# Patient Record
Sex: FEMALE | Race: OTHER | HISPANIC OR LATINO | URBAN - METROPOLITAN AREA
[De-identification: names, ages, dates, MRNs, and addresses within clinical notes are randomized per-mention and may not be internally consistent; named-entity substitution may affect disease eponyms.]

---

## 2019-06-30 ENCOUNTER — OUTPATIENT (OUTPATIENT)
Dept: OUTPATIENT SERVICES | Facility: HOSPITAL | Age: 54
LOS: 1 days | Discharge: HOME | End: 2019-06-30
Payer: MEDICAID

## 2019-06-30 DIAGNOSIS — C73 MALIGNANT NEOPLASM OF THYROID GLAND: ICD-10-CM

## 2019-06-30 PROCEDURE — 70491 CT SOFT TISSUE NECK W/DYE: CPT | Mod: 26

## 2019-08-14 ENCOUNTER — OUTPATIENT (OUTPATIENT)
Dept: OUTPATIENT SERVICES | Facility: HOSPITAL | Age: 54
LOS: 1 days | Discharge: HOME | End: 2019-08-14
Payer: MEDICAID

## 2019-08-14 DIAGNOSIS — E06.9 THYROIDITIS, UNSPECIFIED: ICD-10-CM

## 2019-08-15 PROCEDURE — 78020 THYROID MET UPTAKE: CPT | Mod: 26

## 2019-08-15 PROCEDURE — 78018 THYROID MET IMAGING BODY: CPT | Mod: 26

## 2021-11-16 ENCOUNTER — EMERGENCY (EMERGENCY)
Facility: HOSPITAL | Age: 56
LOS: 0 days | Discharge: HOME | End: 2021-11-16
Attending: STUDENT IN AN ORGANIZED HEALTH CARE EDUCATION/TRAINING PROGRAM | Admitting: EMERGENCY MEDICINE
Payer: MEDICAID

## 2021-11-16 VITALS
TEMPERATURE: 98 F | WEIGHT: 199.96 LBS | DIASTOLIC BLOOD PRESSURE: 77 MMHG | HEART RATE: 88 BPM | RESPIRATION RATE: 18 BRPM | SYSTOLIC BLOOD PRESSURE: 195 MMHG | OXYGEN SATURATION: 99 %

## 2021-11-16 DIAGNOSIS — E03.9 HYPOTHYROIDISM, UNSPECIFIED: ICD-10-CM

## 2021-11-16 DIAGNOSIS — I10 ESSENTIAL (PRIMARY) HYPERTENSION: ICD-10-CM

## 2021-11-16 DIAGNOSIS — E11.9 TYPE 2 DIABETES MELLITUS WITHOUT COMPLICATIONS: ICD-10-CM

## 2021-11-16 DIAGNOSIS — R07.89 OTHER CHEST PAIN: ICD-10-CM

## 2021-11-16 DIAGNOSIS — M79.602 PAIN IN LEFT ARM: ICD-10-CM

## 2021-11-16 DIAGNOSIS — J45.909 UNSPECIFIED ASTHMA, UNCOMPLICATED: ICD-10-CM

## 2021-11-16 DIAGNOSIS — Z87.891 PERSONAL HISTORY OF NICOTINE DEPENDENCE: ICD-10-CM

## 2021-11-16 DIAGNOSIS — R53.83 OTHER FATIGUE: ICD-10-CM

## 2021-11-16 DIAGNOSIS — Z20.822 CONTACT WITH AND (SUSPECTED) EXPOSURE TO COVID-19: ICD-10-CM

## 2021-11-16 DIAGNOSIS — E78.5 HYPERLIPIDEMIA, UNSPECIFIED: ICD-10-CM

## 2021-11-16 LAB
ALBUMIN SERPL ELPH-MCNC: 4.6 G/DL — SIGNIFICANT CHANGE UP (ref 3.5–5.2)
ALP SERPL-CCNC: 104 U/L — SIGNIFICANT CHANGE UP (ref 30–115)
ALT FLD-CCNC: 37 U/L — SIGNIFICANT CHANGE UP (ref 0–41)
ANION GAP SERPL CALC-SCNC: 18 MMOL/L — HIGH (ref 7–14)
AST SERPL-CCNC: 29 U/L — SIGNIFICANT CHANGE UP (ref 0–41)
BASOPHILS # BLD AUTO: 0.1 K/UL — SIGNIFICANT CHANGE UP (ref 0–0.2)
BASOPHILS NFR BLD AUTO: 0.9 % — SIGNIFICANT CHANGE UP (ref 0–1)
BILIRUB SERPL-MCNC: 0.3 MG/DL — SIGNIFICANT CHANGE UP (ref 0.2–1.2)
BUN SERPL-MCNC: 13 MG/DL — SIGNIFICANT CHANGE UP (ref 10–20)
CALCIUM SERPL-MCNC: 9.4 MG/DL — SIGNIFICANT CHANGE UP (ref 8.5–10.1)
CHLORIDE SERPL-SCNC: 101 MMOL/L — SIGNIFICANT CHANGE UP (ref 98–110)
CO2 SERPL-SCNC: 20 MMOL/L — SIGNIFICANT CHANGE UP (ref 17–32)
CREAT SERPL-MCNC: 0.6 MG/DL — LOW (ref 0.7–1.5)
EOSINOPHIL # BLD AUTO: 0.75 K/UL — HIGH (ref 0–0.7)
EOSINOPHIL NFR BLD AUTO: 7 % — SIGNIFICANT CHANGE UP (ref 0–8)
GLUCOSE SERPL-MCNC: 74 MG/DL — SIGNIFICANT CHANGE UP (ref 70–99)
HCG SERPL QL: NEGATIVE — SIGNIFICANT CHANGE UP
HCT VFR BLD CALC: 40.9 % — SIGNIFICANT CHANGE UP (ref 37–47)
HGB BLD-MCNC: 13.1 G/DL — SIGNIFICANT CHANGE UP (ref 12–16)
IMM GRANULOCYTES NFR BLD AUTO: 0.3 % — SIGNIFICANT CHANGE UP (ref 0.1–0.3)
LYMPHOCYTES # BLD AUTO: 3.92 K/UL — HIGH (ref 1.2–3.4)
LYMPHOCYTES # BLD AUTO: 36.8 % — SIGNIFICANT CHANGE UP (ref 20.5–51.1)
MAGNESIUM SERPL-MCNC: 2.1 MG/DL — SIGNIFICANT CHANGE UP (ref 1.8–2.4)
MCHC RBC-ENTMCNC: 25.8 PG — LOW (ref 27–31)
MCHC RBC-ENTMCNC: 32 G/DL — SIGNIFICANT CHANGE UP (ref 32–37)
MCV RBC AUTO: 80.7 FL — LOW (ref 81–99)
MONOCYTES # BLD AUTO: 0.94 K/UL — HIGH (ref 0.1–0.6)
MONOCYTES NFR BLD AUTO: 8.8 % — SIGNIFICANT CHANGE UP (ref 1.7–9.3)
NEUTROPHILS # BLD AUTO: 4.9 K/UL — SIGNIFICANT CHANGE UP (ref 1.4–6.5)
NEUTROPHILS NFR BLD AUTO: 46.2 % — SIGNIFICANT CHANGE UP (ref 42.2–75.2)
NRBC # BLD: 0 /100 WBCS — SIGNIFICANT CHANGE UP (ref 0–0)
NT-PROBNP SERPL-SCNC: 32 PG/ML — SIGNIFICANT CHANGE UP (ref 0–300)
PLATELET # BLD AUTO: 437 K/UL — HIGH (ref 130–400)
POTASSIUM SERPL-MCNC: 4.6 MMOL/L — SIGNIFICANT CHANGE UP (ref 3.5–5)
POTASSIUM SERPL-SCNC: 4.6 MMOL/L — SIGNIFICANT CHANGE UP (ref 3.5–5)
PROT SERPL-MCNC: 7.7 G/DL — SIGNIFICANT CHANGE UP (ref 6–8)
RBC # BLD: 5.07 M/UL — SIGNIFICANT CHANGE UP (ref 4.2–5.4)
RBC # FLD: 14.4 % — SIGNIFICANT CHANGE UP (ref 11.5–14.5)
SARS-COV-2 RNA SPEC QL NAA+PROBE: SIGNIFICANT CHANGE UP
SODIUM SERPL-SCNC: 139 MMOL/L — SIGNIFICANT CHANGE UP (ref 135–146)
TROPONIN T SERPL-MCNC: <0.01 NG/ML — SIGNIFICANT CHANGE UP
TROPONIN T SERPL-MCNC: <0.01 NG/ML — SIGNIFICANT CHANGE UP
WBC # BLD: 10.64 K/UL — SIGNIFICANT CHANGE UP (ref 4.8–10.8)
WBC # FLD AUTO: 10.64 K/UL — SIGNIFICANT CHANGE UP (ref 4.8–10.8)

## 2021-11-16 PROCEDURE — 93010 ELECTROCARDIOGRAM REPORT: CPT

## 2021-11-16 PROCEDURE — 71046 X-RAY EXAM CHEST 2 VIEWS: CPT | Mod: 26,77

## 2021-11-16 PROCEDURE — 99218: CPT

## 2021-11-16 PROCEDURE — 71046 X-RAY EXAM CHEST 2 VIEWS: CPT | Mod: 26

## 2021-11-16 RX ORDER — KETOROLAC TROMETHAMINE 30 MG/ML
30 SYRINGE (ML) INJECTION ONCE
Refills: 0 | Status: DISCONTINUED | OUTPATIENT
Start: 2021-11-16 | End: 2021-11-16

## 2021-11-16 RX ORDER — METFORMIN HYDROCHLORIDE 850 MG/1
850 TABLET ORAL DAILY
Refills: 0 | Status: DISCONTINUED | OUTPATIENT
Start: 2021-11-16 | End: 2021-11-16

## 2021-11-16 RX ORDER — ATORVASTATIN CALCIUM 80 MG/1
40 TABLET, FILM COATED ORAL AT BEDTIME
Refills: 0 | Status: DISCONTINUED | OUTPATIENT
Start: 2021-11-16 | End: 2021-11-16

## 2021-11-16 RX ORDER — LEVOTHYROXINE SODIUM 125 MCG
137 TABLET ORAL DAILY
Refills: 0 | Status: DISCONTINUED | OUTPATIENT
Start: 2021-11-16 | End: 2021-11-16

## 2021-11-16 RX ORDER — ASPIRIN/CALCIUM CARB/MAGNESIUM 324 MG
81 TABLET ORAL DAILY
Refills: 0 | Status: DISCONTINUED | OUTPATIENT
Start: 2021-11-17 | End: 2021-11-16

## 2021-11-16 RX ORDER — ASPIRIN/CALCIUM CARB/MAGNESIUM 324 MG
325 TABLET ORAL ONCE
Refills: 0 | Status: COMPLETED | OUTPATIENT
Start: 2021-11-16 | End: 2021-11-16

## 2021-11-16 RX ORDER — PANTOPRAZOLE SODIUM 20 MG/1
40 TABLET, DELAYED RELEASE ORAL ONCE
Refills: 0 | Status: COMPLETED | OUTPATIENT
Start: 2021-11-16 | End: 2021-11-16

## 2021-11-16 RX ORDER — ASPIRIN/CALCIUM CARB/MAGNESIUM 324 MG
162 TABLET ORAL ONCE
Refills: 0 | Status: DISCONTINUED | OUTPATIENT
Start: 2021-11-16 | End: 2021-11-16

## 2021-11-16 RX ADMIN — Medication 30 MILLIGRAM(S): at 15:45

## 2021-11-16 RX ADMIN — Medication 325 MILLIGRAM(S): at 18:14

## 2021-11-16 RX ADMIN — Medication 30 MILLIGRAM(S): at 15:16

## 2021-11-16 RX ADMIN — PANTOPRAZOLE SODIUM 40 MILLIGRAM(S): 20 TABLET, DELAYED RELEASE ORAL at 23:51

## 2021-11-16 RX ADMIN — ATORVASTATIN CALCIUM 40 MILLIGRAM(S): 80 TABLET, FILM COATED ORAL at 22:49

## 2021-11-16 NOTE — ED PROVIDER NOTE - NS ED ATTENDING STATEMENT MOD
Adali-daughter called.     Patient was supposed to have received a call from the office about arranging an injection appointment. Patient had a virtual visit with Dr. Burks last month.   Please advise on when to schedule.    Please call Adali @ [phone]   Attending with

## 2021-11-16 NOTE — ED PROVIDER NOTE - PROGRESS NOTE DETAILS
spoke with pt via [place]  #[id number] explained all results, pt agreeable to overnight stay in OBS for CCTA

## 2021-11-16 NOTE — ED PROVIDER NOTE - CLINICAL SUMMARY MEDICAL DECISION MAKING FREE TEXT BOX
Labs ok, EKG nonischemic. CXR no infiltrate. Concerned for possible cardiac etiology for sx, will place in CDU for further workup. Pt amenable to plan.

## 2021-11-16 NOTE — ED PROVIDER NOTE - OBJECTIVE STATEMENT
Pt is a 57y/o female with a pmhx of Asthma, HTN, HLD, DM, Hypothyroidism here for eval of constant pressure-like left sided chest discomfort with radiation down left arm that began last night while at rest with associated fatigue specifically on exertion. Pt denies fever, chills, cough, n/v/d, abd pain, dysuria, hematuria

## 2021-11-16 NOTE — ED CDU PROVIDER INITIAL DAY NOTE - NS ED ROS FT
Constitutional: (-) fever  Eyes/ENT: (-) blurry vision, (-) epistaxis  Cardiovascular: (+) chest pain, (-) syncope  Respiratory: (-) cough, (-) shortness of breath  Gastrointestinal: (-) vomiting, (-) diarrhea  Musculoskeletal: (+) neck pain, (+) LUE pain, (-) back pain, (-) joint pain  Integumentary: (-) rash, (-) edema  Neurological: (-) headache, (-) altered mental status  Psychiatric: (-) hallucinations  Allergic/Immunologic: (-) pruritus

## 2021-11-16 NOTE — ED CDU PROVIDER INITIAL DAY NOTE - MEDICAL DECISION MAKING DETAILS
56F with pmh HTN, HLD, hypothyroidism, asthma well controlled who p/w L sided CP radiating to LUE. EKG non-ischemic, trop neg x2, CXR clear. Patient received ASA 324mg, toradol, and protonix. Patient placed in obs for CCTA.

## 2021-11-16 NOTE — ED CDU PROVIDER INITIAL DAY NOTE - OBJECTIVE STATEMENT
The pt is a 56y F w/ hx of HTN, DLD, DM, asthma, hypothyroidism presenting with retrosternal/L sided chest pain that radiates to the L neck & L arm. Pt states she has felt this chest pain in the past, but never to this severity or with this radiation. This episode started last night, at its worst is 10/10. Today after Toradol in the ED and Aspirin, pt states pain is improved. No associated SOB. Denies fever, headache, N/V, abdominal pain, diarrhea, dysuria.

## 2021-11-16 NOTE — ED PROVIDER NOTE - NS ED ROS FT
Eyes:  No visual changes, eye pain or discharge.  ENMT:  No hearing changes, pain, discharge or infections. No neck pain or stiffness.  Cardiac:  + CP No SOB or edema. No chest pain with exertion.  Respiratory:  No cough or respiratory distress. No hemoptysis. No history of asthma or RAD.  GI:  No nausea, vomiting, diarrhea or abdominal pain.  :  No dysuria, frequency or burning.  MS:  No myalgia, muscle weakness, joint pain or back pain.  Neuro:  No headache or weakness.  No LOC.  Skin:  No skin rash.   Endocrine: + dm No history of thyroid disease   Except as documented in the HPI,  all other systems are negative.

## 2021-11-16 NOTE — ED PROVIDER NOTE - ATTENDING CONTRIBUTION TO CARE
55yo woman, former smoker with h/o asthma, HTN, DM, HLD, hypothyroid c/o pressurelike chest pain, L sided, radiating down L arm that began last night. Additionally she complains of feeling very tired when trying to get things done--walking up stairs, doing normal household tasks. No fever, chills, cough. VS, exam as noted, pt nontoxic appearing, lungs CTA, CVS1S2 RRR abd soft, no peripheral edema. Will check labs, EKG, CXR, consider CDU for further ACS workup.

## 2021-11-17 VITALS — HEART RATE: 60 BPM

## 2021-11-17 PROCEDURE — G1004: CPT

## 2021-11-17 PROCEDURE — 75574 CT ANGIO HRT W/3D IMAGE: CPT | Mod: 26,MF

## 2021-11-17 PROCEDURE — 99217: CPT

## 2021-11-17 RX ORDER — METOPROLOL TARTRATE 50 MG
50 TABLET ORAL ONCE
Refills: 0 | Status: COMPLETED | OUTPATIENT
Start: 2021-11-17 | End: 2021-11-17

## 2021-11-17 RX ADMIN — Medication 5 MILLIGRAM(S): at 05:27

## 2021-11-17 RX ADMIN — Medication 50 MILLIGRAM(S): at 08:12

## 2021-11-17 RX ADMIN — Medication 81 MILLIGRAM(S): at 13:11

## 2021-11-17 RX ADMIN — Medication 137 MICROGRAM(S): at 05:28

## 2021-11-17 RX ADMIN — Medication 50 MILLIGRAM(S): at 07:06

## 2021-11-17 NOTE — ED CDU PROVIDER DISPOSITION NOTE - CARE PROVIDER_API CALL
your PMD,   Phone: (   )    -  Fax: (   )    -  Follow Up Time: 1-3 Days    Clint Rolon (MD)  Cardiovascular Disease; Internal Medicine; Interventional Cardiology  [address] [place] [address]  Phone: [phone]  Fax: [phone]  Follow Up Time: 1-3 Days

## 2021-11-17 NOTE — ED CDU PROVIDER SUBSEQUENT DAY NOTE - PROGRESS NOTE DETAILS
Pt resting comfortably, denies any complaints. Will continue to monitor. patient comfortable, awaiting CCTA Dr Reyes discussed results with patient. Will dc home with outpatient f/u

## 2021-11-17 NOTE — ED CDU PROVIDER DISPOSITION NOTE - PATIENT PORTAL LINK FT
You can access the FollowMyHealth Patient Portal offered by [facility] by registering at the following website: [email or web address]. By joining [employer]’s FollowMyHealth portal, you will also be able to view your health information using other applications (apps) compatible with our system.

## 2021-11-17 NOTE — ED CDU PROVIDER DISPOSITION NOTE - CLINICAL COURSE
56F with pmh HTN, HLD, hypothyroidism, asthma well controlled who p/w L sided CP radiating to LUE. EKG non-ischemic, trop neg x2, CXR clear. Patient received ASA 324mg, toradol, and protonix. Patient placed in obs for CCTA which showed a calcium score of 1 and CAD-RADS of 0. Results were discussed with the patient, her , and her daughter. I have fully discussed the medical management and delivery of care with the patient. I have discussed any available labs, imaging and treatment options with the patient. All Questions answered at the bedside and printed copies of all results provided and recommended to review with PCP. Patient confirms understanding and has been given detailed return precautions. Patient instructed to return to the ED should symptoms persist or worsen. Patient has demonstrated capacity and has verbalized understanding. Patient is well appearing upon discharge, ambulatory with a steady gait.

## 2021-11-17 NOTE — ED CDU PROVIDER SUBSEQUENT DAY NOTE - MEDICAL DECISION MAKING DETAILS
56F with pmh HTN, HLD, hypothyroidism, asthma well controlled who p/w L sided CP radiating to LUE. EKG non-ischemic, trop neg x2, CXR clear. Patient received ASA 324mg, toradol, and protonix. Patient placed in obs for CCTA. Comfortable appearing this morning.

## 2021-11-17 NOTE — ED ADULT NURSE REASSESSMENT NOTE - NS ED NURSE REASSESS COMMENT FT1
Patient report received from previous RN, patient at this time is resting in bed with no acute distress, alert and oriented x4, admitted to OBS, will go for CCTA this AM pending controlled HR, will continue to watch and assess patient, safety and comfort measures maintained.

## 2021-11-17 NOTE — ED CDU PROVIDER DISPOSITION NOTE - NSFOLLOWUPINSTRUCTIONS_ED_ALL_ED_FT
Chest Pain    Chest pain can be caused by many different conditions which may or may not be dangerous. Causes include heartburn, lung infections, heart attack, blood clot in lungs, skin infections, strain or damage to muscle, cartilage, or bones, etc. In addition to a history and physical examination, an electrocardiogram (ECG) or other lab tests may have been performed to determine the cause of your chest pain. Follow up with your primary care provider or with a cardiologist as instructed.     SEEK IMMEDIATE MEDICAL CARE IF YOU HAVE ANY OF THE FOLLOWING SYMPTOMS: worsening chest pain, coughing up blood, unexplained back/neck/jaw pain, severe abdominal pain, dizziness or lightheadedness, fainting, shortness of breath, sweaty or clammy skin, vomiting, or racing heart beat. These symptoms may represent a serious problem that is an emergency. Do not wait to see if the symptoms will go away. Get medical help right away. Call 911 and do not drive yourself to the hospital.        Dolor de pecho    LO QUE NECESITA SABER:    El dolor en el pecho puede ser provocado por ama variedad de condiciones, algunas no tan serias y otras que son de peligro mortal. El dolor de pecho también puede ser un síntoma de un problema digestivo, marzena la acidez o ama úlcera estomacal. Un ataque de ansiedad o ama emoción desi, marzena el enojo, también pueden provocar dolor de pecho. Ama infección, inflamación o fractura en un hueso o cartílago en el pecho podría provocar dolor o molestia. En ocasiones el dolor torácico o la presión en el pecho pueden ser el resultado de ricky circulación de la sandy al corazón (angina). El dolor de pecho también puede ser causado por trastornos potencialmente mortales marzena un ataque al corazón o un coágulo de sandy en los pulmones.    INSTRUCCIONES SOBRE EL [facility]STOP[facility] HOSPITALARIA:    Llame al número local de emergencias (911 en los [place]) o pídale a alguien que llame si:  •Tiene alguno de los siguientes signos de un ataque cardíaco: ?Estrujamiento, presión o tensión en [facility]blood pecho      ?Usted también podría presentar alguno de los siguientes: ?Malestar o dolor en youngblood espalda, mami, mandíbula, abdomen, o brazo      ?Falta de aliento      ?Náuseas o vómitos      ?Desvanecimiento o sudor frío repentino            Regrese a la radhika de emergencias si:  •La inflamación en youngblood pecho empeora, aun con tratamiento.      •Usted tose o vomita sandy.      •Eugenia heces son negras o tienen sandy.      •Usted no puede dejar de vomitar o le duele al tragar.      Llame a youngblood médico si:  •Usted tiene preguntas o inquietudes acerca de youngblood condición o cuidado.          Medicamentos:  •Los medicamentospueden administrarse para tratar la causa del dolor de pecho. Por ejemplo, analgésicos, medicamentos para la ansiedad o medicamentos para aumentar el flujo de sandy al corazón.      •No tome ciertos medicamentos sin antes preguntarle a youngblood médico.Estos incluyen ADRIAN, suplementos vitamínicos o a base de hierbas u hormonas (estrógeno o progestágeno).      •[place] eugenia medicamentos marzena se le haya indicado.Consulte con youngblood médico si usted braden que youngblood medicamento no le está ayudando o si presenta efectos secundarios. Infórmele si es alérgico a cualquier medicamento. Mantenga ama lista actualizada de los medicamentos, las vitaminas y los productos herbales que francis. Incluya los siguientes datos de los medicamentos: cantidad, frecuencia y motivo de administración. Traiga con usted la lista o los envases de las píldoras a eugenia citas de seguimiento. Lleve la lista de los medicamentos con usted en dora de ama emergencia.      Consejos para vivir saludable:Los siguientes son consejos generales de keeley. Si se conoce la causa de youngblood dolor de pecho, youngblood médico le dará pautas específicas a seguir.  •No fume.La nicotina y otros químicos contenidos en los cigarrillos y cigarros pueden causar daño a eugenia pulmones y el corazón. Pida información a youngblood médico si usted actualmente fuma y necesita ayuda para dejar de fumar. Los cigarrillos electrónicos o el tabaco sin humo igualmente contienen nicotina. Consulte con youngblood médico antes de utilizar estos productos.      •Elija ama variedad de alimentos saludables tan a menudo marzena sea posible.Incluya frutas y verduras frescas, congeladas o enlatadas. También incluya productos lácteos bajos en grasa, pescado, danyelle (sin piel) y anne magras. Youngblood médico o dietista pueden ayudarlo a crear planes de alimentos. Es posible que tenga que evitar ciertos alimentos o bebidas si el dolor es causado por un problema de digestión.  Alimentos saludables           •Reduzca el consumo de sodio (sal).Limite el consumo de alimentos altos en sodio, marzena comidas enlatadas, bocadillos salados y embutidos. Si añade felice cuando cocina la comida, no añada más en la jones. Elija alimentos enlatados bajos en sodio tanto marzena sea posible.             •Consuma abundante agua al día.El agua ayuda al cuerpo a controlar la temperatura y la presión arterial. Pregunte a youngblood médico cuál es la cantidad de agua que debería consumir cada día.      •Pregunte acerca de la actividad.Youngblood médico le dirá cuáles actividades limitar y cuáles evitar. Pregunte cuándo puede manejar, regresar a youngblood trabajo y tener relaciones sexuales. Pida más información acerca de un plan de ejercicio adecuado para usted.      •Mantenga un peso saludable.Pregúntele a youngblood médico cuál es el peso ideal para usted. Pídale que lo ayude a crear un plan seguro para bajar de peso si tiene sobrepeso.      •Pregunte sobre las vacunas que pudiera necesitar.Vacúnese contra la influenza (gripe) todos los años tan pronto marzena se recomiende, normalmente en septiembre u octubre. Usted también podría necesitar la vacuna antineumocócica para evitar la neumonía. La vacuna se administra generalmente cada 5 años, a partir de los 65 años. Youngblood médico puede indicarle si debe recibir otras vacunas, y cuándo aplicárselas.      Programe ama amanda con youngblood médico dentro de 72 horas o marzena se le indique:Es posible que deba regresar para hacerse más pruebas para encontrar la causa del dolor de pecho. Es probable que lo refieran a un especialista, marzena un cardiólogo o un gastroenterólogo. Anote eugenia preguntas para que se acuerde de hacerlas ama eugenia visitas.       © Copyright [employer] 2021

## 2021-11-17 NOTE — ED CDU PROVIDER DISPOSITION NOTE - PROVIDER TOKENS
FREE:[LAST:[your PMD],PHONE:[(   )    -],FAX:[(   )    -],FOLLOWUP:[1-3 Days]],PROVIDER:[TOKEN:[[id number]],FOLLOWUP:[1-3 Days]]
